# Patient Record
Sex: MALE | Race: WHITE | Employment: FULL TIME | URBAN - METROPOLITAN AREA
[De-identification: names, ages, dates, MRNs, and addresses within clinical notes are randomized per-mention and may not be internally consistent; named-entity substitution may affect disease eponyms.]

---

## 2020-04-01 ENCOUNTER — HOSPITAL ENCOUNTER (EMERGENCY)
Facility: CLINIC | Age: 20
Discharge: HOME | End: 2020-04-01
Attending: EMERGENCY MEDICINE | Admitting: EMERGENCY MEDICINE
Payer: OTHER MISCELLANEOUS

## 2020-04-01 ENCOUNTER — APPOINTMENT (OUTPATIENT)
Dept: GENERAL RADIOLOGY | Facility: CLINIC | Age: 20
End: 2020-04-01
Attending: EMERGENCY MEDICINE
Payer: OTHER MISCELLANEOUS

## 2020-04-01 VITALS
HEIGHT: 76 IN | DIASTOLIC BLOOD PRESSURE: 85 MMHG | BODY MASS INDEX: 28.01 KG/M2 | WEIGHT: 230 LBS | OXYGEN SATURATION: 97 % | SYSTOLIC BLOOD PRESSURE: 123 MMHG | HEART RATE: 77 BPM | RESPIRATION RATE: 18 BRPM | TEMPERATURE: 98.3 F

## 2020-04-01 DIAGNOSIS — S69.92XA INJURY OF FINGER OF LEFT HAND, INITIAL ENCOUNTER: ICD-10-CM

## 2020-04-01 PROCEDURE — 90715 TDAP VACCINE 7 YRS/> IM: CPT | Performed by: EMERGENCY MEDICINE

## 2020-04-01 PROCEDURE — 90471 IMMUNIZATION ADMIN: CPT

## 2020-04-01 PROCEDURE — 25000128 H RX IP 250 OP 636: Performed by: EMERGENCY MEDICINE

## 2020-04-01 PROCEDURE — 99283 EMERGENCY DEPT VISIT LOW MDM: CPT | Mod: 25

## 2020-04-01 PROCEDURE — 64400 NJX AA&/STRD TRIGEMINAL NRV: CPT

## 2020-04-01 PROCEDURE — 73140 X-RAY EXAM OF FINGER(S): CPT | Mod: LT

## 2020-04-01 RX ORDER — CEPHALEXIN 500 MG/1
500 CAPSULE ORAL 4 TIMES DAILY
Qty: 28 CAPSULE | Refills: 0 | Status: SHIPPED | OUTPATIENT
Start: 2020-04-01 | End: 2020-04-08

## 2020-04-01 RX ORDER — LIDOCAINE HYDROCHLORIDE 10 MG/ML
INJECTION, SOLUTION INFILTRATION; PERINEURAL
Status: DISCONTINUED
Start: 2020-04-01 | End: 2020-04-01 | Stop reason: HOSPADM

## 2020-04-01 RX ADMIN — CLOSTRIDIUM TETANI TOXOID ANTIGEN (FORMALDEHYDE INACTIVATED), CORYNEBACTERIUM DIPHTHERIAE TOXOID ANTIGEN (FORMALDEHYDE INACTIVATED), BORDETELLA PERTUSSIS TOXOID ANTIGEN (GLUTARALDEHYDE INACTIVATED), BORDETELLA PERTUSSIS FILAMENTOUS HEMAGGLUTININ ANTIGEN (FORMALDEHYDE INACTIVATED), BORDETELLA PERTUSSIS PERTACTIN ANTIGEN, AND BORDETELLA PERTUSSIS FIMBRIAE 2/3 ANTIGEN 0.5 ML: 5; 2; 2.5; 5; 3; 5 INJECTION, SUSPENSION INTRAMUSCULAR at 11:17

## 2020-04-01 ASSESSMENT — MIFFLIN-ST. JEOR: SCORE: 2149.77

## 2020-04-01 ASSESSMENT — ENCOUNTER SYMPTOMS: NUMBNESS: 0

## 2020-04-01 NOTE — ED PROVIDER NOTES
"  History     Chief Complaint:  Hand Injury    HPI   Alex Biswas Jr. is a right-handed, 19 year old male who presents for evaluation of a hand injury. 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 He states this happened about 30 minutes prior to arrival. The patient denies any numbness or tingling in the finger. Per [facility], the patient's last Tdap was in 2011.     Allergies:  No Known Drug Allergies     Medications:    The patient is not currently taking any prescribed medications.     Past Medical History:    The patient denies any significant past medical history.     Past Surgical History:    Tonsillectomy and Adenoidectomy   Splectomy    Family History:    Father: stroke    Social History:  The patient was unaccompanied to the ED.  Smoking Status: Never Smoker  Smokeless Tobacco: Never Used  Alcohol Use: Negative      Review of Systems   Neurological: Negative for numbness.        Denies tingling   All other systems reviewed and are negative.      Physical Exam     Patient Vitals for the past 24 hrs:   BP Temp Temp src Heart Rate Resp SpO2 Height Weight   04/01/20 1040 -- -- -- -- -- -- 1.93 m (6' 4\") 104.3 kg (230 lb)   04/01/20 1034 127/72 98.3  F (36.8  C) Oral 88 18 96 % -- --        Physical Exam  General:              Well-nourished              Speaking in full sentences  Eyes:              Conjunctiva without injection or scleral icterus  Resp:              Even, non-labored respirations  CV:                    RRR  Skin:              L index finger:              Metal staple impaling through ulnar aspect of distal phalanx penetrating through volar surface              No active bleeding              Does not penetrate through nail  MSK:              Moves all extremities              Full ROM to flexion/extension at MCP, PIP and DIP  Neuro:              Alert              " Answers questions appropriately              Moves all extremities equally              Gait stable  Psych:              Normal affect, normal mood    Emergency Department Course     Imaging:  Radiographic findings were communicated with the patient who voiced understanding of the findings.    Fingers XR, 2-3 views, left  IMPRESSION: There is soft tissue swelling in the index finger. No soft  tissue gas or foreign body evident. No fracture. No evidence of  Osteomyelitis. Reading per radiology.     Procedures:       Foreign Body Removal     LOCATION:  Left index finger    FUNCTION:  Distally sensation, circulation, motor and tendon function are intact.     ANESTHESIA:  Local using 1% lidocaine without epi  total of 4 mLs.    PREPARATION:  Irrigation and Scrubbing with Normal Saline and Betadine.     PROCEDURE:  Prior to foreign body removal, finger was cleansed and foreign body cleansed with betadine. The staple was removed successfully with pliers. The wound was then irrigated with normal saline and sureclens.  Discussed wound care.     Interventions:  1117 Tdap 0.5 mL Intramuscular    Emergency Department Course:   Past medical records, nursing notes, and vitals reviewed.  1032: I performed an exam of the patient and obtained history, as documented above.     The patient was sent for a Fingers XR while in the emergency department, results above.      1050 I performed a foreign body removal, details above.    Tdap administered.     1114 I rechecked and updated the patient.     Findings and plan explained to the Patient. Patient discharged home with instructions regarding supportive care, medications, and reasons to return. The importance of close follow-up was reviewed. The patient was prescribed Keflex.     Impression & Plan      Medical Decision Making:  Alex Biswas is a 19-year-old male presenting to the ER for evaluation of a hand injury.  VS on presentation unremarkable.  Exam notable for a metal staple  penetrating through the distal phalanx of his left index finger.  After adequate anesthesia was obtained, this was successfully removed.  X-ray demonstrates no evidence of bony fracture.  No evidence of disruption to extensor or flexor tendon.  Tetanus status was updated in accordance with tetanus prophylaxis guidelines.  He has received his child vaccination series and thus is not felt to require tetanus immunoglobulin.  He will be started on oral Keflex for wound prophylaxis.  Wound was thoroughly irrigated and cleansed during his ED course and a dressing was applied.  Return precautions discussed including signs suggestive of infection including erythema, discharge, fevers, or any other concerns.  All questions answered prior to discharge.    Diagnosis:    ICD-10-CM    1. Injury of finger of left hand, initial encounter  S69.92XA        Disposition:  discharged to home    Discharge Medications:  New Prescriptions    CEPHALEXIN (KEFLEX) 500 MG CAPSULE    Take 1 capsule (500 mg) by mouth 4 times daily for 7 days     Lisbet CASAS, am serving as a scribe on 4/1/2020 at 10:29 AM to personally document services performed by Geovanni Salcido MD based on my observations and the provider's statements to me.      Lisbet Colbert  4/1/2020   [facility] EMERGENCY DEPARTMENT       Geovanni Salcido MD  04/01/20 1121

## 2020-04-01 NOTE — ED AVS SNAPSHOT
[facility]  [address]  [facility] [address]  Phone:  [phone]  Fax:  [phone]                                    Alex Biswas Jr.   MRN: [id number]    Department:  [facility] Emergency Department   Date of Visit:  4/1/2020           After Visit Summary Signature Page    I have received my discharge instructions, and my questions have been answered. I have discussed any challenges I see with this plan with the nurse or doctor.    ..........................................................................................................................................  Patient/Patient Representative Signature      ..........................................................................................................................................  Patient Representative Print Name and Relationship to Patient    ..................................................               ................................................  Date                                   Time    ..........................................................................................................................................  Reviewed by Signature/Title    ...................................................              ..............................................  Date                                               Time          22EPIC Rev 08/18

## 2020-04-01 NOTE — DISCHARGE INSTRUCTIONS
Begin antibiotic    Monitor closely for signs of infection    Return to ER with worsening pain, spreading redness, discharge of pus, fevers, or any other concerns.